# Patient Record
Sex: FEMALE | Race: WHITE | NOT HISPANIC OR LATINO | Employment: STUDENT | URBAN - NONMETROPOLITAN AREA
[De-identification: names, ages, dates, MRNs, and addresses within clinical notes are randomized per-mention and may not be internally consistent; named-entity substitution may affect disease eponyms.]

---

## 2024-09-27 ENCOUNTER — OFFICE VISIT (OUTPATIENT)
Dept: FAMILY MEDICINE CLINIC | Facility: CLINIC | Age: 19
End: 2024-09-27

## 2024-09-27 VITALS
BODY MASS INDEX: 24.99 KG/M2 | WEIGHT: 150 LBS | OXYGEN SATURATION: 99 % | HEART RATE: 65 BPM | DIASTOLIC BLOOD PRESSURE: 58 MMHG | TEMPERATURE: 98.5 F | HEIGHT: 65 IN | SYSTOLIC BLOOD PRESSURE: 100 MMHG

## 2024-09-27 DIAGNOSIS — R09.82 POST-NASAL DRAINAGE: Primary | ICD-10-CM

## 2024-09-27 DIAGNOSIS — R11.0 NAUSEA: ICD-10-CM

## 2024-09-27 PROCEDURE — 99213 OFFICE O/P EST LOW 20 MIN: CPT | Performed by: PHYSICIAN ASSISTANT

## 2024-09-27 RX ORDER — NORETHINDRONE ACETATE AND ETHINYL ESTRADIOL AND FERROUS FUMARATE 1.5-30(21)
1 KIT ORAL DAILY
COMMUNITY

## 2024-09-27 RX ORDER — AZELASTINE 1 MG/ML
2 SPRAY, METERED NASAL 2 TIMES DAILY
Qty: 30 ML | Refills: 3 | Status: SHIPPED | OUTPATIENT
Start: 2024-09-27

## 2025-01-27 ENCOUNTER — OFFICE VISIT (OUTPATIENT)
Dept: FAMILY MEDICINE CLINIC | Facility: CLINIC | Age: 20
End: 2025-01-27

## 2025-01-27 VITALS
HEIGHT: 65 IN | BODY MASS INDEX: 29.16 KG/M2 | WEIGHT: 175 LBS | SYSTOLIC BLOOD PRESSURE: 112 MMHG | HEART RATE: 56 BPM | OXYGEN SATURATION: 100 % | DIASTOLIC BLOOD PRESSURE: 62 MMHG

## 2025-01-27 DIAGNOSIS — R09.82 POST-NASAL DRAINAGE: ICD-10-CM

## 2025-01-27 DIAGNOSIS — H65.23 BILATERAL CHRONIC SEROUS OTITIS MEDIA: Primary | ICD-10-CM

## 2025-01-27 PROCEDURE — 99213 OFFICE O/P EST LOW 20 MIN: CPT | Performed by: PHYSICIAN ASSISTANT

## 2025-01-27 RX ORDER — ALBUTEROL SULFATE 90 UG/1
INHALANT RESPIRATORY (INHALATION)
COMMUNITY

## 2025-01-27 RX ORDER — PSEUDOEPHEDRINE HCL 120 MG/1
120 TABLET, FILM COATED, EXTENDED RELEASE ORAL EVERY 12 HOURS
Qty: 30 TABLET | Refills: 1 | Status: SHIPPED | OUTPATIENT
Start: 2025-01-27

## 2025-01-27 RX ORDER — CETIRIZINE HYDROCHLORIDE 10 MG/1
10 TABLET ORAL DAILY
Qty: 30 TABLET | Refills: 1 | Status: SHIPPED | OUTPATIENT
Start: 2025-01-27

## 2025-01-27 RX ORDER — IPRATROPIUM BROMIDE 21 UG/1
2 SPRAY, METERED NASAL EVERY 12 HOURS
Qty: 30 ML | Refills: 1 | Status: SHIPPED | OUTPATIENT
Start: 2025-01-27

## 2025-01-27 NOTE — PROGRESS NOTES
".Chief Complaint  Earache    Subjective          History of Present Illness  Haylee Wang is here today with her  History of Present Illness  The patient is a 19-year-old female who presents with ear pain.    She reports experiencing bilateral ear pain, which began in the right ear a few days prior, characterized by sharp, intermittent pains that intensify upon swallowing. The onset of pain in the left ear was noted yesterday. She has no associated symptoms such as rhinorrhea, nasal congestion, or postnasal drip. She has a past medical history of otitis media, with the most recent episode occurring approximately one year ago. She has never undergone tympanostomy tube insertion. [place] campus and does not require a note for class.    She has discontinued the use of azelastine nasal spray due to perceived ineffectiveness. She has previously used Sudafed or decongestants but currently does not have any at home.    MEDICATIONS  Discontinued: Azelastine nasal spray  Past: Sudafed    Objective   Vital Signs:   /62 (BP Location: Left arm, Patient Position: Sitting, Cuff Size: Adult)   Pulse 56   Ht 165.1 cm (65\")   Wt 79.4 kg (175 lb)   SpO2 100%   BMI 29.12 kg/m²     Body mass index is 29.12 kg/m².  BMI is >= 25 and <30. (Overweight) The following options were offered after discussion;: information on healthy weight added to patient's after visit summary       Review of Systems      Current Outpatient Medications:   •  Junel FE 1.5/30 1.5-30 MG-MCG tablet, Take 1 tablet by mouth Daily., Disp: , Rfl:   •  albuterol sulfate HFA (Ventolin HFA) 108 (90 Base) MCG/ACT inhaler, INHALE ONE (1) PUFF INTO THE LUNGS EVERY 4 (FOUR) HOURS AS NEEDED FOR WHEEZING., Disp: , Rfl:   •  cetirizine (zyrTEC) 10 MG tablet, Take 1 tablet by mouth Daily., Disp: 30 tablet, Rfl: 1  •  ipratropium (ATROVENT) 0.03 % nasal spray, Administer 2 sprays into the nostril(s) as directed by provider Every 12 (Twelve) Hours., Disp: " 30 mL, Rfl: 1  •  pseudoephedrine (SUDAFED) 120 MG 12 hr tablet, Take 1 tablet by mouth Every 12 (Twelve) Hours., Disp: 30 tablet, Rfl: 1    Allergies: Adhesive tape, Iodinated contrast media, and Wasp venom    Physical Exam  Vitals and nursing note reviewed.   Constitutional:       General: She is not in acute distress.     Appearance: Normal appearance. She is normal weight. She is not ill-appearing, toxic-appearing or diaphoretic.   HENT:      Head: Normocephalic and atraumatic.      Right Ear: Ear canal and external ear normal.      Left Ear: Ear canal and external ear normal.      Ears:      Comments: Clear fluid behind both TM      Nose: Congestion present.      Mouth/Throat:      Mouth: Mucous membranes are moist.      Comments: Clear post nasal drainage  Eyes:      Pupils: Pupils are equal, round, and reactive to light.   Cardiovascular:      Rate and Rhythm: Normal rate and regular rhythm.      Heart sounds: Normal heart sounds.   Pulmonary:      Effort: Pulmonary effort is normal.      Breath sounds: Normal breath sounds.   Neurological:      General: No focal deficit present.      Mental Status: She is alert.   Psychiatric:         Mood and Affect: Mood normal.         Behavior: Behavior normal.      Physical Exam  Fluid is present behind both ears.  Lungs were auscultated.    Result Review :          Results               Assessment and Plan    Diagnoses and all orders for this visit:    1. Bilateral chronic serous otitis media (Primary)  -     cetirizine (zyrTEC) 10 MG tablet; Take 1 tablet by mouth Daily.  Dispense: 30 tablet; Refill: 1  -     pseudoephedrine (SUDAFED) 120 MG 12 hr tablet; Take 1 tablet by mouth Every 12 (Twelve) Hours.  Dispense: 30 tablet; Refill: 1  The presence of clear, non-infected fluid behind both tympanic membranes is noted, contributing to her discomfort. A prescription for ipratropium bromide nasal spray has been issued to alleviate the symptoms. Additionally, Zyrtec has  been recommended for daily use. A prescription for Sudafed has also been provided, with the patient being informed about the potential side effect of insomnia. She is advised to take all medications concurrently. If there is no improvement in her condition within a few days of starting the medication, she is instructed to inform us.      2. Post-nasal drainage  -     ipratropium (ATROVENT) 0.03 % nasal spray; Administer 2 sprays into the nostril(s) as directed by provider Every 12 (Twelve) Hours.  Dispense: 30 mL; Refill: 1      Assessment & Plan        Follow Up   No follow-ups on file.  Patient was given instructions and counseling regarding her condition or for health maintenance advice. Please see specific information pulled into the AVS if appropriate.     Patient or patient representative verbalized consent for the use of Ambient Listening during the visit with  CHHAYA Brizuela for chart documentation. 1/27/2025  09:47 EST    CHHAYA Brizuela  01/27/2025

## 2025-03-17 ENCOUNTER — OFFICE VISIT (OUTPATIENT)
Dept: FAMILY MEDICINE CLINIC | Facility: CLINIC | Age: 20
End: 2025-03-17

## 2025-03-17 VITALS
OXYGEN SATURATION: 100 % | BODY MASS INDEX: 29.16 KG/M2 | HEIGHT: 65 IN | WEIGHT: 175 LBS | SYSTOLIC BLOOD PRESSURE: 116 MMHG | RESPIRATION RATE: 18 BRPM | DIASTOLIC BLOOD PRESSURE: 58 MMHG | HEART RATE: 51 BPM

## 2025-03-17 DIAGNOSIS — F41.1 GENERALIZED ANXIETY DISORDER: Primary | ICD-10-CM

## 2025-03-17 DIAGNOSIS — F32.1 CURRENT MODERATE EPISODE OF MAJOR DEPRESSIVE DISORDER, UNSPECIFIED WHETHER RECURRENT: ICD-10-CM

## 2025-03-17 RX ORDER — CITALOPRAM HYDROBROMIDE 10 MG/1
10 TABLET ORAL DAILY
Qty: 30 TABLET | Refills: 1 | Status: SHIPPED | OUTPATIENT
Start: 2025-03-17

## 2025-03-17 RX ORDER — HYDROXYZINE HYDROCHLORIDE 25 MG/1
25 TABLET, FILM COATED ORAL NIGHTLY
Qty: 30 TABLET | Refills: 1 | Status: SHIPPED | OUTPATIENT
Start: 2025-03-17

## 2025-03-17 NOTE — PROGRESS NOTES
".Chief Complaint  Anxiety    Subjective          History of Present Illness  Haylee Wang is here today with her  History of Present Illness  The patient presents for evaluation of anxiety.  Patient has been seeing Dr Hunt in counseling who has recommended that medication for her symptoms may be beneficial  She has been experiencing heightened levels of worry, even over minor issues, to the extent that it has become a persistent state. This constant anxiety has led to a blurring of her emotional boundaries, making it difficult for her to distinguish between feelings of happiness and numbness. Her sleep is often disrupted due to these worries, and she exhibits restlessness, such as fidgeting and leg movement, particularly foot pumping during sleep. Attempts to limit screen time before bed have been unsuccessful, as they tend to increase her anxiety and further hinder her sleep. She reports no suicidal ideation. She has not previously taken any medication for these symptoms. She has health insurance outside of [place].    FAMILY HISTORY  Her sister was on Zoloft for depression or anxiety but is off of it now.    Objective   Vital Signs:   /58 (BP Location: Left arm, Patient Position: Sitting, Cuff Size: Adult)   Pulse 51   Resp 18   Ht 165.1 cm (65\")   Wt 79.4 kg (175 lb)   SpO2 100%   BMI 29.12 kg/m²     Body mass index is 29.12 kg/m².         Review of Systems      Current Outpatient Medications:     albuterol sulfate HFA (Ventolin HFA) 108 (90 Base) MCG/ACT inhaler, INHALE ONE (1) PUFF INTO THE LUNGS EVERY 4 (FOUR) HOURS AS NEEDED FOR WHEEZING., Disp: , Rfl:     Junel FE 1.5/30 1.5-30 MG-MCG tablet, Take 1 tablet by mouth Daily., Disp: , Rfl:     cetirizine (zyrTEC) 10 MG tablet, Take 1 tablet by mouth Daily. (Patient not taking: Reported on 3/17/2025), Disp: 30 tablet, Rfl: 1    citalopram (CeleXA) 10 MG tablet, Take 1 tablet by mouth Daily., Disp: 30 tablet, Rfl: 1    hydrOXYzine " (ATARAX) 25 MG tablet, Take 1 tablet by mouth Every Night., Disp: 30 tablet, Rfl: 1    ipratropium (ATROVENT) 0.03 % nasal spray, Administer 2 sprays into the nostril(s) as directed by provider Every 12 (Twelve) Hours. (Patient not taking: Reported on 3/17/2025), Disp: 30 mL, Rfl: 1    pseudoephedrine (SUDAFED) 120 MG 12 hr tablet, Take 1 tablet by mouth Every 12 (Twelve) Hours. (Patient not taking: Reported on 3/17/2025), Disp: 30 tablet, Rfl: 1    Allergies: Adhesive tape, Iodinated contrast media, and Wasp venom    Physical Exam   Physical Exam      Result Review :          Results               Assessment and Plan    Diagnoses and all orders for this visit:    1. Generalized anxiety disorder (Primary)    2. Current moderate episode of major depressive disorder, unspecified whether recurrent    -     citalopram (CeleXA) 10 MG tablet; Take 1 tablet by mouth Daily.  Dispense: 30 tablet; Refill: 1  -     hydrOXYzine (ATARAX) 25 MG tablet; Take 1 tablet by mouth Every Night.  Dispense: 30 tablet; Refill: 1      Assessment & Plan    She reports excessive worry, difficulty sleeping, and restlessness. She has been advised to avoid concurrent use of Zyrtec and hydroxyzine due to potential excessive sedation. A prescription for Celexa 10 mg has been issued, with instructions to start at a low dose and gradually increase as needed. It may take up to 6 weeks to see the full effect, but improvement is often noticed within a couple of weeks. A prescription for hydroxyzine 25 mg has also been provided to aid in sleep regulation. If she experiences any suicidal ideation while on these medications, she is to discontinue use immediately and contact the clinic or Dr. Emery.    Follow-up  The patient will follow up in 2 to 3 weeks.      Follow Up   No follow-ups on file.  Patient was given instructions and counseling regarding her condition or for health maintenance advice. Please see specific information pulled into the AVS if  appropriate.     Patient or patient representative verbalized consent for the use of Ambient Listening during the visit with  CHHAYA Brizuela for chart documentation. 3/18/2025  14:27 EDT    CHHAYA Brizuela  03/17/2025